# Patient Record
Sex: MALE | Race: WHITE | NOT HISPANIC OR LATINO
[De-identification: names, ages, dates, MRNs, and addresses within clinical notes are randomized per-mention and may not be internally consistent; named-entity substitution may affect disease eponyms.]

---

## 2019-10-17 ENCOUNTER — TRANSCRIPTION ENCOUNTER (OUTPATIENT)
Age: 54
End: 2019-10-17

## 2020-01-07 ENCOUNTER — TRANSCRIPTION ENCOUNTER (OUTPATIENT)
Age: 55
End: 2020-01-07

## 2020-11-05 ENCOUNTER — APPOINTMENT (OUTPATIENT)
Dept: NEUROSURGERY | Facility: CLINIC | Age: 55
End: 2020-11-05
Payer: COMMERCIAL

## 2020-11-05 VITALS
BODY MASS INDEX: 25.76 KG/M2 | HEIGHT: 68 IN | SYSTOLIC BLOOD PRESSURE: 166 MMHG | HEART RATE: 84 BPM | WEIGHT: 170 LBS | DIASTOLIC BLOOD PRESSURE: 95 MMHG

## 2020-11-05 DIAGNOSIS — M54.16 RADICULOPATHY, LUMBAR REGION: ICD-10-CM

## 2020-11-05 DIAGNOSIS — M51.26 OTHER INTERVERTEBRAL DISC DISPLACEMENT, LUMBAR REGION: ICD-10-CM

## 2020-11-05 PROCEDURE — 99072 ADDL SUPL MATRL&STAF TM PHE: CPT

## 2020-11-05 PROCEDURE — 99204 OFFICE O/P NEW MOD 45 MIN: CPT

## 2020-11-05 NOTE — PLAN
[FreeTextEntry1] : \par DIAGNOSIS:  LUMBAR DISK HERNIATION  / STENOSIS L45  LEFT \par TREATMENT PLAN:  NON-SURGICAL  VS    L4-L5   LEFT   MICRODISKECTOMY \par \par This is a patient with lumbar herniated disk and radiculopathy.   I have recommended nonsurgical management at this time.  This consists of physical therapy and/or manual medicine in conjunction to medical therapy and other conservative methods.  These include the consideration of trigger point injections and or the utilization of modalities such as TENS where applicable.  The next tier would be the referral to a pain management specialist (anesthesia or physiatry) for the consideration of spinal injections.  This includes the options of epidural steroids, facet injections as well as other novel techniques that may provide pain relief.  \par \par If all nonsurgical methods fail , I have recommended lumbar microdiskectomy  as a treatment option.  This entails removing the lamina and the medial facet joint along with the underlying hypertrophied ligamentum flavum and retrieving the herniated disk fragment as well as removing any weakened or damaged disk material at this level.  This will allow for a widening of the spinal canal and the neuroforamen at the effected level thus decompressing the nerve root. This should not result in added instability to the spine at this level.  This will not require the need for instrumented stabilization and fusion. \par \par Risks and benefits of surgery were described to the patient in detail which include but not excluding those otherwise not mentioned:  coma paralysis, death, stroke, spinal fluid leak, nerve injury, weakness, infection, spinal instability, vascular injury, re-herniation, adjacent segment degeneration and persistent pain.   \par \par I have reviewed the images in detail with the patient today in my office and have answered all questions regarding this condition to the best of my ability to the patient’s satisfaction.

## 2020-11-05 NOTE — PHYSICAL EXAM
[Antalgic] : antalgic [Able to toe walk] : the patient was able to toe walk [Able to heel walk] : the patient was not able to heel walk [4] : 4/5 Great Toe Extension (L5)

## 2020-11-05 NOTE — HISTORY OF PRESENT ILLNESS
[de-identified] : This is a pleasant 55-year-old here with complaints of severe back and left leg pain. He is a [employer] by trade and does quite a bit of heavy lifting. He states about 8 weeks ago or so he had lifted a water bucket and turned. He felt a twinge in his back but not severe pain. He states that night however his pain was excruciatingly bad it felt like someone had stabbed in the back. This led to having extremely severe back and leg pain on the left. He went to a local hospital and had a CAT scan performed was sent home. He really didn't get any treatment other than some oral steroid medication which provided no relief. His pain persisted to the point were he would [facility] and was treated in the emergency department after having an MRI of the lumbar spine revealing a herniated disc at L4-5. He was given intravenous steroids medication and then sent home. He had some improvement with this medication and is now here for my evaluation. He saw another neurosurgeon locally who doesn't take his insurance however was told he may benefit from a microdiscectomy. This point does have persistent radiculopathy on the left. He states he does not have any foot drop particularly or any cauda equina-like symptoms.\par \par \par \par [employer]      grabbed water bucket   2 months ago \par \par PMD  Wilber Isabel\par [facility]

## 2020-11-05 NOTE — RESULTS/DATA
[FreeTextEntry1] : \par [place] MRI was reviewed. It shows a fairly large disc herniation at L4/L5  on the left.

## 2020-12-03 ENCOUNTER — OUTPATIENT (OUTPATIENT)
Dept: OUTPATIENT SERVICES | Facility: HOSPITAL | Age: 55
LOS: 1 days | End: 2020-12-03
Payer: COMMERCIAL

## 2020-12-03 ENCOUNTER — NON-APPOINTMENT (OUTPATIENT)
Age: 55
End: 2020-12-03

## 2020-12-03 PROCEDURE — 93010 ELECTROCARDIOGRAM REPORT: CPT

## 2020-12-06 DIAGNOSIS — Z01.818 ENCOUNTER FOR OTHER PREPROCEDURAL EXAMINATION: ICD-10-CM

## 2020-12-07 ENCOUNTER — EMERGENCY (EMERGENCY)
Facility: HOSPITAL | Age: 55
LOS: 1 days | Discharge: HOME | End: 2020-12-07
Payer: COMMERCIAL

## 2020-12-07 ENCOUNTER — APPOINTMENT (OUTPATIENT)
Dept: DISASTER EMERGENCY | Facility: CLINIC | Age: 55
End: 2020-12-07

## 2020-12-07 VITALS
SYSTOLIC BLOOD PRESSURE: 163 MMHG | DIASTOLIC BLOOD PRESSURE: 103 MMHG | RESPIRATION RATE: 18 BRPM | OXYGEN SATURATION: 99 % | HEIGHT: 68 IN | WEIGHT: 169.98 LBS | TEMPERATURE: 97 F | HEART RATE: 94 BPM

## 2020-12-07 PROCEDURE — 99282 EMERGENCY DEPT VISIT SF MDM: CPT

## 2020-12-07 PROCEDURE — U0003: CPT

## 2020-12-07 NOTE — ED ADULT TRIAGE NOTE - CHIEF COMPLAINT QUOTE
Patient arrived to ED today with c/o need for COVID swab for presurgical testing to be preformed.  Patient reports he is having surgery on his herniated disc.

## 2020-12-07 NOTE — ED PROVIDER NOTE - NS ED ROS FT
Denies fever, chills, fatigue, and weight loss. Denies HA, Dizziness. ENMT: Denies URI symptoms, difficulty swallowing, sore throat, loss of taste or smell. CARDIO: Denies CP, palpitations, edema. RESP: Denies Cough, SOB, Diff breathing, hemoptysis. GI: Denies N/V, ABD pain, change in bowel movement.. MS: Denies joint pain, back pain, weakness, decreased ROM, swelling. NEURO: Denies change in gait, seizures, loss of sensation, dizziness, confusion LOC. SKIN: denies rash or discoloration

## 2020-12-07 NOTE — ED PROVIDER NOTE - OBJECTIVE STATEMENT
54yo M pt pmhx HTN presenting to the ER for COVID-19 testing. Pt is having surgery on a herniated disc this week and needs covid swab prior to surgery. No symptoms at this time. Denies fevers chills, loss of taste or smell, URI symptoms, chest pain or shortness of breath, nausea vomiting diarrhea abdominal pain, weakness or fatigue. Eating and drinking normal diet. Normal output. Pt requesting testing at this time.

## 2020-12-07 NOTE — ED PROVIDER NOTE - CLINICAL SUMMARY MEDICAL DECISION MAKING FREE TEXT BOX
Asymptomatic visit for covid swab for pre-surg. Pt well appearing, in NAD, non-toxic appearing. Not hypoxic. No tachypnea, lungs clear on exam. Pt educated on proper supportive care, infection control precautions, which patient populations to avoid and importance of self-quarantining at home. Stable for discharge home.

## 2020-12-07 NOTE — ED PROVIDER NOTE - PHYSICAL EXAMINATION
Const: AOX3 nontoxic appearing, no apparent respiratory or physical distress. Stable gait   HEENT: NC/AT. Moist mucous membranes.  Eyes: ESTELLE. EOMI  Cardiac: Regular rate and regular rhythm. +S1/S2. Peripheral pulses 2+ and symmetric. No LE edema  Resp: Speaking in full sentences. No evidence of respiratory distress. No wheezes, rales or rhonchi. No adventitious breath sounds   Skin: No rashes, abrasions or lacerations.  Neuro: Awake, alert & oriented x 3. Moves all extremities symmetrically

## 2020-12-07 NOTE — ED PROVIDER NOTE - PATIENT PORTAL LINK FT
You can access the FollowMyHealth Patient Portal offered by [facility] by registering at the following website: [email or web address]. By joining [email or web address]’s FollowMyHealth portal, you will also be able to view your health information using other applications (apps) compatible with our system.

## 2020-12-09 PROBLEM — I10 ESSENTIAL (PRIMARY) HYPERTENSION: Chronic | Status: ACTIVE | Noted: 2020-12-07

## 2020-12-09 LAB — SARS-COV-2 N GENE NPH QL NAA+PROBE: NOT DETECTED

## 2020-12-10 ENCOUNTER — APPOINTMENT (OUTPATIENT)
Dept: NEUROSURGERY | Facility: HOSPITAL | Age: 55
End: 2020-12-10
Payer: COMMERCIAL

## 2020-12-10 ENCOUNTER — OUTPATIENT (OUTPATIENT)
Dept: OUTPATIENT SERVICES | Facility: HOSPITAL | Age: 55
LOS: 1 days | End: 2020-12-10

## 2020-12-10 PROCEDURE — 63030 LAMOT DCMPRN NRV RT 1 LMBR: CPT | Mod: AS,LT

## 2020-12-10 PROCEDURE — 63030 LAMOT DCMPRN NRV RT 1 LMBR: CPT | Mod: LT

## 2020-12-18 ENCOUNTER — APPOINTMENT (OUTPATIENT)
Dept: NEUROSURGERY | Facility: CLINIC | Age: 55
End: 2020-12-18
Payer: COMMERCIAL

## 2020-12-18 VITALS
WEIGHT: 170 LBS | HEIGHT: 68 IN | SYSTOLIC BLOOD PRESSURE: 155 MMHG | HEART RATE: 79 BPM | BODY MASS INDEX: 25.76 KG/M2 | DIASTOLIC BLOOD PRESSURE: 90 MMHG

## 2020-12-18 DIAGNOSIS — Z78.9 OTHER SPECIFIED HEALTH STATUS: ICD-10-CM

## 2020-12-18 DIAGNOSIS — Z98.890 OTHER SPECIFIED POSTPROCEDURAL STATES: ICD-10-CM

## 2020-12-18 DIAGNOSIS — Z72.89 OTHER PROBLEMS RELATED TO LIFESTYLE: ICD-10-CM

## 2020-12-18 PROCEDURE — 99024 POSTOP FOLLOW-UP VISIT: CPT

## 2020-12-18 NOTE — PHYSICAL EXAM
[General Appearance - Alert] : alert [General Appearance - In No Acute Distress] : in no acute distress [General Appearance - Well Nourished] : well nourished [General Appearance - Well Developed] : well developed [General Appearance - Well-Appearing] : healthy appearing [] : normal voice and communication [Longitudinal] : longitudinal [Clean] : clean [Dry] : dry [Healing Well] : healing well [Intact] : intact [Person] : oriented to person [Place] : oriented to place [Time] : oriented to time [Motor Tone] : muscle tone was normal in all four extremities [Motor Strength] : muscle strength was normal in all four extremities [Involuntary Movements] : no involuntary movements were seen [No Muscle Atrophy] : normal bulk in all four extremities [Motor Handedness Right-Handed] : the patient is right hand dominant [Abnormal Walk] : normal gait [Balance] : balance was intact [Normal] : normal [FreeTextEntry1] : lumbar incision

## 2020-12-18 NOTE — HISTORY OF PRESENT ILLNESS
[FreeTextEntry1] : Mr. Abdi presents to the office today one week status post left L4-5 microdiscectomy. He is doing really well and reports significant improvement in his leg pain. He is no longer taking the Percocet and only taking the Methocarbamol and Naproxen as needed for pain. He is ambulating well without any assistance and offers no other complaints.

## 2020-12-18 NOTE — ASSESSMENT
[FreeTextEntry1] : Mr. Abdi presents to the office today one week s/p L4-5 microdiscectomy. He is to increase his activity as tolerated. He is to avoid any heavy lifting, bending, twisting for the next two months. He may apply ice to the back as needed for pain and swelling. He is not to submerge the wound in water until completely healed. he may continue with the naproxen as needed for pain. He is to follow-up in the office in one month. he is in agreement with this plan.

## 2021-01-19 ENCOUNTER — APPOINTMENT (OUTPATIENT)
Dept: NEUROSURGERY | Facility: CLINIC | Age: 56
End: 2021-01-19

## 2024-12-24 PROBLEM — F10.90 ALCOHOL USE: Status: ACTIVE | Noted: 2020-12-18
